# Patient Record
Sex: MALE | Race: WHITE | NOT HISPANIC OR LATINO | Employment: FULL TIME | URBAN - METROPOLITAN AREA
[De-identification: names, ages, dates, MRNs, and addresses within clinical notes are randomized per-mention and may not be internally consistent; named-entity substitution may affect disease eponyms.]

---

## 2017-01-10 ENCOUNTER — TELEPHONE (OUTPATIENT)
Dept: INTERNAL MEDICINE | Facility: CLINIC | Age: 32
End: 2017-01-10

## 2017-01-10 NOTE — TELEPHONE ENCOUNTER
Panel Management Review      Patient has the following on his problem list:     Diabetes    ASA: Unknown    Last A1C  A1C      9.1   2/18/2016  A1C     10.3   1/28/2016  A1C     11.7   10/15/2015  A1C tested: Failed    Last LDL:    CHOL      180   1/28/2016  HDL       48   1/28/2016  LDL       90   1/28/2016  TRIG      209   1/28/2016  No results found for this basename: cholishlratiestrella  NHDL      132   1/28/2016    Is the patient on a Statin? YES             Is the patient on Aspirin? Not sure    Medications     HMG CoA Reductase Inhibitors    simvastatin (ZOCOR) 10 MG tablet          Last three blood pressure readings:  BP Readings from Last 3 Encounters:   11/29/16 151/100   08/17/16 130/80   10/15/15 136/88       Date of last diabetes office visit: 8/2016     Tobacco History:     History   Smoking status     Never Smoker    Smokeless tobacco     Never Used           Hypertension   Last three blood pressure readings:  BP Readings from Last 3 Encounters:   11/29/16 151/100   08/17/16 130/80   10/15/15 136/88     Blood pressure: Failed    HTN Guidelines:  Age 18-59 BP range:  Less than 140/90  Age 60-85 with Diabetes:  Less than 140/90  Age 60-85 without Diabetes:  less than 150/90      Composite cancer screening  Chart review shows that this patient is due/due soon for the following None  Summary:    Patient is due/failing the following:   Follow up    Action needed:   Patient needs office visit for DM,HTN follow up.    Type of outreach:    Sent letter.    Questions for provider review:    None                                                                                                                                      TALON Stiles       Chart routed to None .

## 2017-04-13 ENCOUNTER — TELEPHONE (OUTPATIENT)
Dept: OTHER | Facility: CLINIC | Age: 32
End: 2017-04-13

## 2017-04-13 NOTE — TELEPHONE ENCOUNTER
Call Regarding Onboarding [facility] CHO[facility]    Attempt 1    Message on voicemail     Comments:       Outreach   Anita Abdi

## 2017-05-31 NOTE — TELEPHONE ENCOUNTER
5/31/2017    Call Regarding Onboarding [employer] [facility]    Attempt 2    Message on voicemail     Comments:       Outreach   betty

## 2017-06-08 NOTE — TELEPHONE ENCOUNTER
6/8/2017    Call Regarding Onboarding [facility] Choices    Attempt 3    Message on voicemail     Comments: 0 dep      Outreach   CC

## 2022-01-10 ENCOUNTER — OFFICE VISIT (OUTPATIENT)
Dept: URGENT CARE | Facility: URGENT CARE | Age: 37
End: 2022-01-10
Payer: COMMERCIAL

## 2022-01-10 VITALS
BODY MASS INDEX: 32.5 KG/M2 | OXYGEN SATURATION: 98 % | DIASTOLIC BLOOD PRESSURE: 103 MMHG | TEMPERATURE: 98.3 F | WEIGHT: 260 LBS | SYSTOLIC BLOOD PRESSURE: 150 MMHG | HEART RATE: 84 BPM

## 2022-01-10 DIAGNOSIS — R10.32 LLQ ABDOMINAL PAIN: Primary | ICD-10-CM

## 2022-01-10 PROCEDURE — 99204 OFFICE O/P NEW MOD 45 MIN: CPT | Performed by: NURSE PRACTITIONER

## 2022-01-10 RX ORDER — GLIPIZIDE 10 MG/1
1 TABLET, FILM COATED, EXTENDED RELEASE ORAL DAILY
COMMUNITY
Start: 2021-12-06 | End: 2022-12-06

## 2022-01-10 NOTE — PROGRESS NOTES
Assessment & Plan     LLQ abdominal pain  Sent to ER for emergent w/u  To r/o diverticulitis vs abscess ve perforation with severe and worsening pain x 1 day.    Offered and pt declined ambulance.      No follow-ups on file.    SAMMY Sanchez CNP [facility] Mayorga is a 36 year old who presents for the following health issues     HPI     Concern - LLQ abd pain  Onset: 1.5 days  Description: severe LLQ pain  Intensity: severe, 10/10  Progression of Symptoms:  worsening  Accompanying Signs & Symptoms: nausea  Previous history of similar problem: none  Precipitating factors:        Worsened by: eating and movement  Alleviating factors:        Improved by: nothing  Therapies tried and outcome:  none       Review of Systems   Constitutional, HEENT, cardiovascular, pulmonary, GI, , musculoskeletal, neuro, skin, endocrine and psych systems are negative, except as otherwise noted.      Objective    BP (!) 150/103   Pulse 84   Temp 98.3  F (36.8  C)   Wt 117.9 kg (260 lb)   SpO2 98%   BMI 32.50 kg/m    Body mass index is 32.5 kg/m .  Physical Exam   GENERAL: healthy, alert and no distress  NECK: no adenopathy, no asymmetry, masses, or scars and thyroid normal to palpation  RESP: lungs clear to auscultation - no rales, rhonchi or wheezes  CV: regular rate and rhythm, normal S1 S2, no S3 or S4, no murmur, click or rub, no peripheral edema and peripheral pulses strong  ABDOMEN: tenderness suprapubic and LLQ  MS: no gross musculoskeletal defects noted, no edema

## 2022-06-28 ENCOUNTER — HOSPITAL ENCOUNTER (EMERGENCY)
Facility: CLINIC | Age: 37
Discharge: HOME | End: 2022-06-28
Attending: EMERGENCY MEDICINE | Admitting: EMERGENCY MEDICINE
Payer: OTHER MISCELLANEOUS

## 2022-06-28 VITALS
SYSTOLIC BLOOD PRESSURE: 120 MMHG | HEART RATE: 89 BPM | TEMPERATURE: 98 F | WEIGHT: 255 LBS | RESPIRATION RATE: 16 BRPM | OXYGEN SATURATION: 99 % | DIASTOLIC BLOOD PRESSURE: 80 MMHG | HEIGHT: 75 IN | BODY MASS INDEX: 31.71 KG/M2

## 2022-06-28 DIAGNOSIS — S61.215A LACERATION OF LEFT RING FINGER WITHOUT FOREIGN BODY WITHOUT DAMAGE TO NAIL, INITIAL ENCOUNTER: ICD-10-CM

## 2022-06-28 DIAGNOSIS — S61.213A LACERATION OF LEFT MIDDLE FINGER WITHOUT FOREIGN BODY WITHOUT DAMAGE TO NAIL, INITIAL ENCOUNTER: ICD-10-CM

## 2022-06-28 PROCEDURE — 99283 EMERGENCY DEPT VISIT LOW MDM: CPT | Mod: 25

## 2022-06-28 PROCEDURE — 90471 IMMUNIZATION ADMIN: CPT | Performed by: EMERGENCY MEDICINE

## 2022-06-28 PROCEDURE — 250N000011 HC RX IP 250 OP 636: Performed by: EMERGENCY MEDICINE

## 2022-06-28 PROCEDURE — 12002 RPR S/N/AX/GEN/TRNK2.6-7.5CM: CPT

## 2022-06-28 PROCEDURE — 90715 TDAP VACCINE 7 YRS/> IM: CPT | Performed by: EMERGENCY MEDICINE

## 2022-06-28 RX ORDER — BUPIVACAINE HYDROCHLORIDE 5 MG/ML
INJECTION, SOLUTION PERINEURAL
Status: DISCONTINUED
Start: 2022-06-28 | End: 2022-06-28 | Stop reason: HOSPADM

## 2022-06-28 RX ADMIN — CLOSTRIDIUM TETANI TOXOID ANTIGEN (FORMALDEHYDE INACTIVATED), CORYNEBACTERIUM DIPHTHERIAE TOXOID ANTIGEN (FORMALDEHYDE INACTIVATED), BORDETELLA PERTUSSIS TOXOID ANTIGEN (GLUTARALDEHYDE INACTIVATED), BORDETELLA PERTUSSIS FILAMENTOUS HEMAGGLUTININ ANTIGEN (FORMALDEHYDE INACTIVATED), BORDETELLA PERTUSSIS PERTACTIN ANTIGEN, AND BORDETELLA PERTUSSIS FIMBRIAE 2/3 ANTIGEN 0.5 ML: 5; 2; 2.5; 5; 3; 5 INJECTION, SUSPENSION INTRAMUSCULAR at 10:12

## 2022-06-28 ASSESSMENT — ENCOUNTER SYMPTOMS
WEAKNESS: 0
NUMBNESS: 0
WOUND: 1

## 2022-06-28 NOTE — ED TRIAGE NOTES
Patient is her for lacerations on his left 5th, 4th, 3rd finger after getting cut by a large knife at work.   He has a dressing in place. He is diaphoretic during triage.     Triage Assessment     Row Name 06/28/22 0972       Triage Assessment (Adult)    Airway WDL WDL       Respiratory WDL    Respiratory WDL WDL       Cardiac WDL    Cardiac WDL WDL       Peripheral/Neurovascular WDL    Peripheral Neurovascular WDL WDL       Cognitive/Neuro/Behavioral WDL    Cognitive/Neuro/Behavioral WDL WDL

## 2022-06-28 NOTE — ED PROVIDER NOTES
"  History   Chief Complaint:  Laceration       The history is provided by the patient.      Mukesh An is a 36 year old male with history of diabetes, hypertension, and hyperlipidemia who presents for evaluation of lacerations to the left middle and ring fingers. He reports that he was cutting pieces of cardboard at work, when his knife slipped and cut his fingers about 15-20 minutes ago. He had immediate onset of pain that is constant and aggravated by touch. The patient denies numbness, weakness, and other injuries. Last tetanus was in 2010.      Review of Systems   Skin: Positive for wound (lacerations, left middle and ring fingers).   Neurological: Negative for weakness and numbness.   All other systems reviewed and are negative.    Allergies:  The patient has no known allergies.     Medications:  Glipizide  Lantus  Lisinopril  Metformin  Simvastatin   Wellbutrin    Past Medical History:      Type 2 diabetes mellitus  Hyperlipidemia  Hypertension     Social History:  This was a work-related injury.     Physical Exam     Patient Vitals for the past 24 hrs:   BP Temp Temp src Pulse Resp SpO2 Height Weight   06/28/22 1100 120/80 -- -- 89 -- 99 % -- --   06/28/22 1030 116/76 -- -- 84 -- 98 % -- --   06/28/22 1015 112/86 -- -- 90 -- 97 % -- --   06/28/22 0949 (!) 149/100 98  F (36.7  C) Oral 78 16 98 % 1.905 m (6' 3\") 115.7 kg (255 lb)       Physical Exam      EYES:   Conjunctiva normal.  NECK:    Supple, no meningismus.   CV:     Regular rate and rhythm     No murmurs, rubs or gallops.       2+ radial pulses bilateral.  PULM:    Clear to auscultation bilateral.       No respiratory distress.      No wheezing, rales or stridor.  MSK:     Left middle finger: no rotational deformity.      FDS, FDP and extensor tendon intact.      No subungual hematoma or nail injury.     Left ring finger: no rotational deformity.      FDS, FDP and extensor tendon intact.      No subungual hematoma or nail injury.  LYMPH:   No " cervical lymphadenopathy.  NEURO:   Left middle and ringer finger: sensation intact.  SKIN:    Warm, dry     2.5 cm full-thickness laceration to the palmar aspect of the left ring finger      Wound explored in bloodless field and full range of motion with no exposed deep structures or exposed tendon     2. cm full-thickness laceration to the palmar aspect of the left middle finger      Wound explored in bloodless field and full range of motion with no exposed deep structures or exposed tendon  PSYCH:    Mood is good and affect is appropriate.      Emergency Department Course     Procedures       Laceration Repair      Procedure: Laceration Repair    Indication: Laceration    Consent: Verbal    Location: Left L third (middle) finger    Length: 2.0 cm    Preparation: Irrigation with Sterile Saline.    Anesthesia/Sedation: Bupivacaine - 0.5% digital block utilizing 4 cc      Treatment/Exploration: Wound explored, no foreign bodies found     Closure: The wound was closed with 1 layer.  Skin was closed with 4 x 5-0 Ethilon using interrupted sutures    Patient Status: The patient tolerated the procedure well: Yes. There were no complications.       Laceration Repair      Procedure: Laceration Repair    Indication: Laceration    Consent: Verbal    Location: Left L fourth (ring) finger    Length: 2.5 cm    Preparation: Irrigation with Sterile saline    Anesthesia/Sedation: Bupivacaine - 0.5%      Treatment/Exploration: Wound explored, no foreign bodies found     Closure: The wound was closed with1 layer.  Skin was closed with 5 x 5-0 Ethilon using interrupted sutures    Patient Status: The patient tolerated the procedure well: Yes. There were no complications.       Emergency Department Course:     Reviewed:  I reviewed nursing notes, vitals, past medical history and MIIC    Assessments:  0924 I obtained history and examined the patient as noted above.   1046 I performed a laceration repair, see procedure note above.  At  this point I feel that the patient is safe for discharge, and the patient agrees.     Interventions:  1012 Tdap 0.5 mL IM    Disposition:  The patient was discharged to home.     Impression & Plan     Medical Decision Makin-year-old male seen the ED with traumatic laceration to the left middle and ring finger.  No deep structure involvement.  Lacerations were repaired as described above.  Sutures out in 7 to 10 days.  Tetanus updated.  Wound care instructions provided.      Diagnosis:    ICD-10-CM    1. Laceration of left middle finger without foreign body without damage to nail, initial encounter  S61.213A    2. Laceration of left ring finger without foreign body without damage to nail, initial encounter  S61.215A        Discharge Medications:  New Prescriptions    No medications on file       Scribe Disclosure:  I, Lisa Slaughterk, am serving as a scribe at 10:01 AM on 2022 to document services personally performed by Walter Copeland MD based on my observations and the provider's statements to me.            Walter Copeland MD  [phone]